# Patient Record
Sex: FEMALE
[De-identification: names, ages, dates, MRNs, and addresses within clinical notes are randomized per-mention and may not be internally consistent; named-entity substitution may affect disease eponyms.]

---

## 2021-10-25 ENCOUNTER — APPOINTMENT (OUTPATIENT)
Dept: ANTEPARTUM | Facility: CLINIC | Age: 41
End: 2021-10-25
Payer: COMMERCIAL

## 2021-10-25 ENCOUNTER — ASOB RESULT (OUTPATIENT)
Age: 41
End: 2021-10-25

## 2021-10-25 PROBLEM — Z00.00 ENCOUNTER FOR PREVENTIVE HEALTH EXAMINATION: Status: ACTIVE | Noted: 2021-10-25

## 2021-10-25 PROCEDURE — 76813 OB US NUCHAL MEAS 1 GEST: CPT

## 2021-12-21 ENCOUNTER — ASOB RESULT (OUTPATIENT)
Age: 41
End: 2021-12-21

## 2021-12-21 ENCOUNTER — APPOINTMENT (OUTPATIENT)
Dept: ANTEPARTUM | Facility: CLINIC | Age: 41
End: 2021-12-21
Payer: COMMERCIAL

## 2021-12-21 PROCEDURE — 76811 OB US DETAILED SNGL FETUS: CPT

## 2022-04-11 ENCOUNTER — APPOINTMENT (OUTPATIENT)
Dept: ANTEPARTUM | Facility: CLINIC | Age: 42
End: 2022-04-11
Payer: MEDICAID

## 2022-04-11 ENCOUNTER — ASOB RESULT (OUTPATIENT)
Age: 42
End: 2022-04-11

## 2022-04-11 PROCEDURE — 76816 OB US FOLLOW-UP PER FETUS: CPT

## 2022-05-06 ENCOUNTER — INPATIENT (INPATIENT)
Facility: HOSPITAL | Age: 42
LOS: 2 days | Discharge: HOME | DRG: 560 | End: 2022-05-09
Attending: OBSTETRICS & GYNECOLOGY | Admitting: OBSTETRICS & GYNECOLOGY
Payer: MEDICAID

## 2022-05-06 DIAGNOSIS — Z3A.00 WEEKS OF GESTATION OF PREGNANCY NOT SPECIFIED: ICD-10-CM

## 2022-05-06 PROCEDURE — 86780 TREPONEMA PALLIDUM: CPT

## 2022-05-06 PROCEDURE — 86769 SARS-COV-2 COVID-19 ANTIBODY: CPT

## 2022-05-06 PROCEDURE — 85025 COMPLETE CBC W/AUTO DIFF WBC: CPT

## 2022-05-06 PROCEDURE — 85014 HEMATOCRIT: CPT

## 2022-05-06 PROCEDURE — 85018 HEMOGLOBIN: CPT

## 2022-05-06 PROCEDURE — U0003: CPT

## 2022-05-06 PROCEDURE — G0463: CPT

## 2022-05-06 PROCEDURE — 86850 RBC ANTIBODY SCREEN: CPT

## 2022-05-06 PROCEDURE — 86901 BLOOD TYPING SEROLOGIC RH(D): CPT

## 2022-05-06 PROCEDURE — 86900 BLOOD TYPING SEROLOGIC ABO: CPT

## 2022-05-06 PROCEDURE — 59050 FETAL MONITOR W/REPORT: CPT

## 2022-05-06 PROCEDURE — 36415 COLL VENOUS BLD VENIPUNCTURE: CPT

## 2022-05-06 PROCEDURE — U0005: CPT

## 2022-05-06 RX ORDER — CITRIC ACID/SODIUM CITRATE 300-500 MG
30 SOLUTION, ORAL ORAL ONCE
Refills: 0 | Status: DISCONTINUED | OUTPATIENT
Start: 2022-05-06 | End: 2022-05-07

## 2022-05-06 RX ORDER — SODIUM CHLORIDE 9 MG/ML
1000 INJECTION, SOLUTION INTRAVENOUS
Refills: 0 | Status: DISCONTINUED | OUTPATIENT
Start: 2022-05-06 | End: 2022-05-07

## 2022-05-06 RX ORDER — OXYTOCIN 10 UNIT/ML
333.33 VIAL (ML) INJECTION
Qty: 20 | Refills: 0 | Status: DISCONTINUED | OUTPATIENT
Start: 2022-05-06 | End: 2022-05-07

## 2022-05-07 ENCOUNTER — TRANSCRIPTION ENCOUNTER (OUTPATIENT)
Age: 42
End: 2022-05-07

## 2022-05-07 VITALS — WEIGHT: 119.05 LBS | HEIGHT: 64 IN

## 2022-05-07 LAB
BASOPHILS # BLD AUTO: 0.02 K/UL — SIGNIFICANT CHANGE UP (ref 0–0.2)
BASOPHILS NFR BLD AUTO: 0.2 % — SIGNIFICANT CHANGE UP (ref 0–2)
COVID-19 SPIKE DOMAIN AB INTERP: POSITIVE
COVID-19 SPIKE DOMAIN ANTIBODY RESULT: 98.4 U/ML — HIGH
EOSINOPHIL # BLD AUTO: 0.07 K/UL — SIGNIFICANT CHANGE UP (ref 0–0.5)
EOSINOPHIL NFR BLD AUTO: 0.8 % — SIGNIFICANT CHANGE UP (ref 0–6)
HCT VFR BLD CALC: 28 % — LOW (ref 34.5–45)
HCT VFR BLD CALC: 35.6 % — SIGNIFICANT CHANGE UP (ref 34.5–45)
HGB BLD-MCNC: 12.5 G/DL — SIGNIFICANT CHANGE UP (ref 11.5–15.5)
HGB BLD-MCNC: 9.5 G/DL — LOW (ref 11.5–15.5)
IMM GRANULOCYTES NFR BLD AUTO: 0.4 % — SIGNIFICANT CHANGE UP (ref 0–1.5)
LYMPHOCYTES # BLD AUTO: 1.39 K/UL — SIGNIFICANT CHANGE UP (ref 1–3.3)
LYMPHOCYTES # BLD AUTO: 16.3 % — SIGNIFICANT CHANGE UP (ref 13–44)
MCHC RBC-ENTMCNC: 33.5 PG — SIGNIFICANT CHANGE UP (ref 27–34)
MCHC RBC-ENTMCNC: 35.1 GM/DL — SIGNIFICANT CHANGE UP (ref 32–36)
MCV RBC AUTO: 95.4 FL — SIGNIFICANT CHANGE UP (ref 80–100)
MONOCYTES # BLD AUTO: 0.6 K/UL — SIGNIFICANT CHANGE UP (ref 0–0.9)
MONOCYTES NFR BLD AUTO: 7 % — SIGNIFICANT CHANGE UP (ref 2–14)
NEUTROPHILS # BLD AUTO: 6.42 K/UL — SIGNIFICANT CHANGE UP (ref 1.8–7.4)
NEUTROPHILS NFR BLD AUTO: 75.3 % — SIGNIFICANT CHANGE UP (ref 43–77)
PLATELET # BLD AUTO: 176 K/UL — SIGNIFICANT CHANGE UP (ref 150–400)
RBC # BLD: 3.73 M/UL — LOW (ref 3.8–5.2)
RBC # FLD: 13 % — SIGNIFICANT CHANGE UP (ref 10.3–14.5)
SARS-COV-2 IGG+IGM SERPL QL IA: 98.4 U/ML — HIGH
SARS-COV-2 IGG+IGM SERPL QL IA: POSITIVE
SARS-COV-2 RNA SPEC QL NAA+PROBE: SIGNIFICANT CHANGE UP
T PALLIDUM AB TITR SER: NEGATIVE — SIGNIFICANT CHANGE UP
WBC # BLD: 8.53 K/UL — SIGNIFICANT CHANGE UP (ref 3.8–10.5)
WBC # FLD AUTO: 8.53 K/UL — SIGNIFICANT CHANGE UP (ref 3.8–10.5)

## 2022-05-07 RX ORDER — LANOLIN
1 OINTMENT (GRAM) TOPICAL EVERY 6 HOURS
Refills: 0 | Status: DISCONTINUED | OUTPATIENT
Start: 2022-05-07 | End: 2022-05-09

## 2022-05-07 RX ORDER — AMPICILLIN TRIHYDRATE 250 MG
2 CAPSULE ORAL ONCE
Refills: 0 | Status: COMPLETED | OUTPATIENT
Start: 2022-05-07 | End: 2022-05-07

## 2022-05-07 RX ORDER — OXYCODONE HYDROCHLORIDE 5 MG/1
5 TABLET ORAL ONCE
Refills: 0 | Status: DISCONTINUED | OUTPATIENT
Start: 2022-05-07 | End: 2022-05-09

## 2022-05-07 RX ORDER — IBUPROFEN 200 MG
600 TABLET ORAL EVERY 6 HOURS
Refills: 0 | Status: DISCONTINUED | OUTPATIENT
Start: 2022-05-07 | End: 2022-05-09

## 2022-05-07 RX ORDER — KETOROLAC TROMETHAMINE 30 MG/ML
30 SYRINGE (ML) INJECTION ONCE
Refills: 0 | Status: DISCONTINUED | OUTPATIENT
Start: 2022-05-07 | End: 2022-05-09

## 2022-05-07 RX ORDER — TENOFOVIR DISOPROXIL FUMARATE 300 MG/1
100 TABLET, FILM COATED ORAL DAILY
Refills: 0 | Status: DISCONTINUED | OUTPATIENT
Start: 2022-05-07 | End: 2022-05-07

## 2022-05-07 RX ORDER — TENOFOVIR DISOPROXIL FUMARATE 300 MG/1
300 TABLET, FILM COATED ORAL DAILY
Refills: 0 | Status: DISCONTINUED | OUTPATIENT
Start: 2022-05-07 | End: 2022-05-09

## 2022-05-07 RX ORDER — MAGNESIUM HYDROXIDE 400 MG/1
30 TABLET, CHEWABLE ORAL
Refills: 0 | Status: DISCONTINUED | OUTPATIENT
Start: 2022-05-07 | End: 2022-05-09

## 2022-05-07 RX ORDER — ACETAMINOPHEN 500 MG
975 TABLET ORAL
Refills: 0 | Status: DISCONTINUED | OUTPATIENT
Start: 2022-05-07 | End: 2022-05-09

## 2022-05-07 RX ORDER — AER TRAVELER 0.5 G/1
1 SOLUTION RECTAL; TOPICAL EVERY 4 HOURS
Refills: 0 | Status: DISCONTINUED | OUTPATIENT
Start: 2022-05-07 | End: 2022-05-09

## 2022-05-07 RX ORDER — AMPICILLIN TRIHYDRATE 250 MG
1 CAPSULE ORAL EVERY 4 HOURS
Refills: 0 | Status: DISCONTINUED | OUTPATIENT
Start: 2022-05-07 | End: 2022-05-07

## 2022-05-07 RX ORDER — OXYTOCIN 10 UNIT/ML
333.33 VIAL (ML) INJECTION
Qty: 20 | Refills: 0 | Status: DISCONTINUED | OUTPATIENT
Start: 2022-05-07 | End: 2022-05-09

## 2022-05-07 RX ORDER — MORPHINE SULFATE 50 MG/1
2 CAPSULE, EXTENDED RELEASE ORAL ONCE
Refills: 0 | Status: DISCONTINUED | OUTPATIENT
Start: 2022-05-07 | End: 2022-05-07

## 2022-05-07 RX ORDER — OXYCODONE HYDROCHLORIDE 5 MG/1
5 TABLET ORAL
Refills: 0 | Status: DISCONTINUED | OUTPATIENT
Start: 2022-05-07 | End: 2022-05-09

## 2022-05-07 RX ORDER — HYDROCORTISONE 1 %
1 OINTMENT (GRAM) TOPICAL EVERY 6 HOURS
Refills: 0 | Status: DISCONTINUED | OUTPATIENT
Start: 2022-05-07 | End: 2022-05-09

## 2022-05-07 RX ORDER — TETANUS TOXOID, REDUCED DIPHTHERIA TOXOID AND ACELLULAR PERTUSSIS VACCINE, ADSORBED 5; 2.5; 8; 8; 2.5 [IU]/.5ML; [IU]/.5ML; UG/.5ML; UG/.5ML; UG/.5ML
0.5 SUSPENSION INTRAMUSCULAR ONCE
Refills: 0 | Status: DISCONTINUED | OUTPATIENT
Start: 2022-05-07 | End: 2022-05-09

## 2022-05-07 RX ORDER — DIPHENHYDRAMINE HCL 50 MG
25 CAPSULE ORAL EVERY 6 HOURS
Refills: 0 | Status: DISCONTINUED | OUTPATIENT
Start: 2022-05-07 | End: 2022-05-09

## 2022-05-07 RX ORDER — SODIUM CHLORIDE 9 MG/ML
3 INJECTION INTRAMUSCULAR; INTRAVENOUS; SUBCUTANEOUS EVERY 8 HOURS
Refills: 0 | Status: DISCONTINUED | OUTPATIENT
Start: 2022-05-07 | End: 2022-05-07

## 2022-05-07 RX ORDER — DIBUCAINE 1 %
1 OINTMENT (GRAM) RECTAL EVERY 6 HOURS
Refills: 0 | Status: DISCONTINUED | OUTPATIENT
Start: 2022-05-07 | End: 2022-05-09

## 2022-05-07 RX ORDER — IBUPROFEN 200 MG
600 TABLET ORAL EVERY 6 HOURS
Refills: 0 | Status: COMPLETED | OUTPATIENT
Start: 2022-05-07 | End: 2023-04-05

## 2022-05-07 RX ORDER — BENZOCAINE 10 %
1 GEL (GRAM) MUCOUS MEMBRANE EVERY 6 HOURS
Refills: 0 | Status: DISCONTINUED | OUTPATIENT
Start: 2022-05-07 | End: 2022-05-09

## 2022-05-07 RX ORDER — SIMETHICONE 80 MG/1
80 TABLET, CHEWABLE ORAL EVERY 4 HOURS
Refills: 0 | Status: DISCONTINUED | OUTPATIENT
Start: 2022-05-07 | End: 2022-05-09

## 2022-05-07 RX ORDER — PRAMOXINE HYDROCHLORIDE 150 MG/15G
1 AEROSOL, FOAM RECTAL EVERY 4 HOURS
Refills: 0 | Status: DISCONTINUED | OUTPATIENT
Start: 2022-05-07 | End: 2022-05-09

## 2022-05-07 RX ADMIN — Medication 216 GRAM(S): at 00:35

## 2022-05-07 RX ADMIN — Medication 600 MILLIGRAM(S): at 13:10

## 2022-05-07 RX ADMIN — Medication 1 TABLET(S): at 12:13

## 2022-05-07 RX ADMIN — Medication 600 MILLIGRAM(S): at 18:33

## 2022-05-07 RX ADMIN — SODIUM CHLORIDE 3 MILLILITER(S): 9 INJECTION INTRAMUSCULAR; INTRAVENOUS; SUBCUTANEOUS at 12:00

## 2022-05-07 RX ADMIN — Medication 975 MILLIGRAM(S): at 16:45

## 2022-05-07 RX ADMIN — Medication 600 MILLIGRAM(S): at 19:30

## 2022-05-07 RX ADMIN — MORPHINE SULFATE 2 MILLIGRAM(S): 50 CAPSULE, EXTENDED RELEASE ORAL at 01:20

## 2022-05-07 RX ADMIN — Medication 600 MILLIGRAM(S): at 12:13

## 2022-05-07 RX ADMIN — Medication 975 MILLIGRAM(S): at 15:49

## 2022-05-07 RX ADMIN — SODIUM CHLORIDE 125 MILLILITER(S): 9 INJECTION, SOLUTION INTRAVENOUS at 00:30

## 2022-05-07 RX ADMIN — Medication 1000 MILLIUNIT(S)/MIN: at 01:15

## 2022-05-07 RX ADMIN — Medication 600 MILLIGRAM(S): at 06:19

## 2022-05-07 RX ADMIN — Medication 600 MILLIGRAM(S): at 06:49

## 2022-05-07 RX ADMIN — TENOFOVIR DISOPROXIL FUMARATE 300 MILLIGRAM(S): 300 TABLET, FILM COATED ORAL at 12:12

## 2022-05-07 NOTE — DISCHARGE NOTE OB - CARE PLAN
1 Principal Discharge DX:	Vaginal delivery  Assessment and plan of treatment:	Congratulations!  Please call the office with any questions or problems.  Place nothing into the vagina for six weeks ( no tampons, sex, or douching).  Call the office for a postpartum appointment to be scheduled six weeks after delivery.  Expect to feel hot flashes in the first two weeks after delivery, especially if your breasts are engorged.

## 2022-05-07 NOTE — DISCHARGE NOTE OB - PATIENT PORTAL LINK FT
You can access the FollowMyHealth Patient Portal offered by [facility] by registering at the following website: [email or web address]. By joining [facility]’s FollowMyHealth portal, you will also be able to view your health information using other applications (apps) compatible with our system.

## 2022-05-07 NOTE — DISCHARGE NOTE OB - CARE PROVIDER_API CALL
Evan Jenkins  OBSTETRICS AND GYNECOLOGY  [address], [address] [place] [address]  Phone: [phone]  Fax: [phone]  Established Patient  Scheduled Appointment: 06/17/2022

## 2022-05-07 NOTE — DISCHARGE NOTE OB - PLAN OF CARE
Congratulations!  Please call the office with any questions or problems.  Place nothing into the vagina for six weeks ( no tampons, sex, or douching).  Call the office for a postpartum appointment to be scheduled six weeks after delivery.  Expect to feel hot flashes in the first two weeks after delivery, especially if your breasts are engorged.

## 2022-05-07 NOTE — PROGRESS NOTE ADULT - ASSESSMENT
Patient is s/p  day# 0  Patient is feeling well and reports no issues.   Tenofavir restarted  Continue the current management with current pain medication regimen.   Encourage ambulation and a regular diet.   Discharge home according to the normal criteria. Patient is s/p  day# 0  Patient is feeling well and reports no issues.   Tenofavir restarted  Continue the current management with current pain medication regimen.   Encourage ambulation and a regular diet.   Discharge home according to the normal criteria.    Attending addendum:  Pt seen and examined at bedside.  Above note appreciated.  Questions answered.  Mgt plan discussed with  pt and FOB.  h/o depression discussed with pt who declined restarting Sertraline presently.  She denies h/o postpartum depression but would like to pursue counseling and therapy as an outpatient.  She is likely to find a therapist who speaks Chinese in [place], [place] where she and her  go frequently for other matters.  Evan Jenkins

## 2022-05-07 NOTE — DISCHARGE NOTE OB - HOSPITAL COURSE
42 yo  at 39 weeks gestation, with h/o Hepatitis B presented c/o painful uterine contractions.  She was found to be in active labor and admitted to L+D.          Hemoglobin: 12.5 g/dL (22 @ 23:57)  She progressed rapidly to full dilatation and pushed very briefly to deliver a viable male infant with no complications.  Her infant was given HBIg and Hepatitis B vaccine as recommended per protocol.  Pt will resume tenofovir.  She elects not to breast feed.     Her postpartum course was uncomplicated                   9.5    x     )-----------( x        ( 07 May 2022 07:45 )             28.0  42 yo  at 39 weeks gestation, with h/o Hepatitis B presented c/o painful uterine contractions.  She was found to be in active labor and admitted to L+D.          Hemoglobin: 12.5 g/dL (22 @ 23:57)  She progressed rapidly to full dilatation and pushed very briefly to deliver a viable male infant with no complications.  Her infant was given HBIg and Hepatitis B vaccine as recommended per protocol.  Pt will resume tenofovir.  She elects not to breast feed.     Her postpartum course was uncomplicated                   9.5    x     )-----------( x        ( 07 May 2022 07:45 )             28.0   Pt is being discharged home in stable condition with her  on postpartum day #2.  She will continue on tenofovir as recommended by her GI doctor, Dr. Gumaro Ortega.

## 2022-05-07 NOTE — DISCHARGE NOTE OB - MATERIALS PROVIDED
Vaccinations/[facility]  Screening Program/  Immunization Record/Bottle Feeding Log/Guide to Postpartum Care/[facility] Hearing Screen Program/Back To Sleep Handout/Shaken Baby Prevention Handout

## 2022-05-08 RX ORDER — ACETAMINOPHEN 500 MG
975 TABLET ORAL
Qty: 0 | Refills: 0 | DISCHARGE
Start: 2022-05-08

## 2022-05-08 RX ORDER — TENOFOVIR DISOPROXIL FUMARATE 300 MG/1
1 TABLET, FILM COATED ORAL
Qty: 90 | Refills: 1
Start: 2022-05-08 | End: 2022-11-03

## 2022-05-08 RX ADMIN — TENOFOVIR DISOPROXIL FUMARATE 300 MILLIGRAM(S): 300 TABLET, FILM COATED ORAL at 10:53

## 2022-05-08 RX ADMIN — Medication 975 MILLIGRAM(S): at 15:30

## 2022-05-08 RX ADMIN — Medication 600 MILLIGRAM(S): at 17:54

## 2022-05-08 RX ADMIN — Medication 975 MILLIGRAM(S): at 22:42

## 2022-05-08 RX ADMIN — Medication 600 MILLIGRAM(S): at 23:25

## 2022-05-08 RX ADMIN — Medication 1 TABLET(S): at 09:05

## 2022-05-08 RX ADMIN — Medication 975 MILLIGRAM(S): at 09:05

## 2022-05-08 RX ADMIN — Medication 975 MILLIGRAM(S): at 21:16

## 2022-05-08 RX ADMIN — Medication 600 MILLIGRAM(S): at 11:50

## 2022-05-09 VITALS
SYSTOLIC BLOOD PRESSURE: 103 MMHG | RESPIRATION RATE: 16 BRPM | TEMPERATURE: 98 F | DIASTOLIC BLOOD PRESSURE: 44 MMHG | HEART RATE: 87 BPM | OXYGEN SATURATION: 97 %

## 2022-05-09 RX ADMIN — TENOFOVIR DISOPROXIL FUMARATE 300 MILLIGRAM(S): 300 TABLET, FILM COATED ORAL at 09:25

## 2022-05-09 RX ADMIN — Medication 975 MILLIGRAM(S): at 14:57

## 2022-05-09 RX ADMIN — Medication 1 TABLET(S): at 09:25

## 2022-05-09 RX ADMIN — Medication 600 MILLIGRAM(S): at 06:06

## 2022-05-09 RX ADMIN — Medication 975 MILLIGRAM(S): at 09:25

## 2022-05-09 RX ADMIN — Medication 975 MILLIGRAM(S): at 03:48

## 2022-05-09 RX ADMIN — Medication 600 MILLIGRAM(S): at 01:27

## 2022-05-09 NOTE — PROGRESS NOTE ADULT - SUBJECTIVE AND OBJECTIVE BOX
PPD #2      Feeling ready for discharge home today.  Pt denies any anxiety or depression.  Declined SSRI.    T(C): 36.5 (22 @ 08:35), Max: 36.5 (22 @ 08:35)  HR: 87 (22 @ 08:35) (87 - 87)  BP: 103/44 (22 @ 08:35) (103/44 - 103/44)  RR: 16 (22 @ 08:35) (16 - 16)  SpO2: 97% (22 @ 08:35) (97% - 97%)  Wt(kg): --    Abd:  soft, NT  Fundus:  firm  - Lochia- Minimal  Extr:  no Monique's sign, no pedal edema        A/P:  PPD#2       routine postpartum care and education  Breast feeding support and education.  Discharge home today.
PPD #1      Tolerating uterine cramps, no heavy vaginal bleeding.      T(C): 36.3 (22 @ 04:54), Max: 37 (22 @ 00:13)  HR: 68 (22 @ 04:54) (68 - 81)  BP: 102/62 (22 @ 04:54) (101/62 - 106/54)  RR: 16 (22 @ 04:54) (16 - 16)  SpO2: 100% (22 @ 04:54) (98% - 100%)  Wt(kg): --    Abd:  soft, NT  Fundus:  firm  - Lochia- Minimal  Extr:  no Monique's sign, trace pedal edema                        9.5    x     )-----------( x        ( 07 May 2022 07:45 )             28.0     A/P:  PPD#1     , stable hemodynamically  routine postpartum care and education  Breast feeding support and education.
MILLIE FINN  [id number]  Postpartum Note Vaginal Delivery  Patient is a 40yo G2 now P2 s/p FT  day 0.  Patient delivered earlier tonight.  Has a history of chronic Hep B on maintenance Tenofavir 100mg daily, that was prescribed    Subjective:  No acute events overnight.   Patient is tolerating diet and denies N/V.   Patient still has slight vaginal bleeding that is decreasing in amount.   She is bottlefeeding  Urinating appropriately.   -BM/+flatus.    Physical exam:  Vital Signs Last 24 Hrs  T(C): 36.6 (07 May 2022 03:23), Max: 37 (07 May 2022 01:30)  T(F): 97.9 (07 May 2022 03:23), Max: 98.6 (07 May 2022 01:30)  HR: 73 (07 May 2022 03:23) (63 - 75)  BP: 116/63 (07 May 2022 03:23) (105/56 - 116/63)  BP(mean): 73 (07 May 2022 03:23) (73 - 73)  RR: 18 (07 May 2022 03:23) (17 - 18)  SpO2: 100% (07 May 2022 03:23) (100% - 100%)    Heart: RRR  Lungs: CTABL  Breast: non tender, not engorged   Abdomen: Soft, nontender, no distension, firm uterine fundus below umbilicus  Ext: No DVT signs, warm extremities    LABS:                        12.5   8.53  )-----------( 176      ( 06 May 2022 23:57 )             35.6       acetaminophen     Tablet .. 975 milliGRAM(s) Oral <User Schedule>  benzocaine 20%/menthol 0.5% Spray 1 Spray(s) Topical every 6 hours PRN  dibucaine 1% Ointment 1 Application(s) Topical every 6 hours PRN  diphenhydrAMINE 25 milliGRAM(s) Oral every 6 hours PRN  diphtheria/tetanus/pertussis (acellular) Vaccine (ADAcel) 0.5 milliLiter(s) IntraMuscular once  hydrocortisone 1% Cream 1 Application(s) Topical every 6 hours PRN  ibuprofen  Tablet. 600 milliGRAM(s) Oral every 6 hours  ketorolac   Injectable 30 milliGRAM(s) IV Push once  lanolin Ointment 1 Application(s) Topical every 6 hours PRN  magnesium hydroxide Suspension 30 milliLiter(s) Oral two times a day PRN  oxyCODONE    IR 5 milliGRAM(s) Oral every 3 hours PRN  oxyCODONE    IR 5 milliGRAM(s) Oral once PRN  oxytocin Infusion 333.333 milliUNIT(s)/Min IV Continuous <Continuous>  pramoxine 1%/zinc 5% Cream 1 Application(s) Topical every 4 hours PRN  prenatal multivitamin 1 Tablet(s) Oral daily  simethicone 80 milliGRAM(s) Chew every 4 hours PRN  sodium chloride 0.9% lock flush 3 milliLiter(s) IV Push every 8 hours  tenofovir alafenamide (VEMLIDY) 100 milliGRAM(s) Oral daily  witch hazel Pads 1 Application(s) Topical every 4 hours PRN

## 2022-05-15 DIAGNOSIS — Z3A.39 39 WEEKS GESTATION OF PREGNANCY: ICD-10-CM

## 2022-05-15 DIAGNOSIS — B18.1 CHRONIC VIRAL HEPATITIS B WITHOUT DELTA-AGENT: ICD-10-CM
